# Patient Record
(demographics unavailable — no encounter records)

---

## 2024-11-04 NOTE — PROCEDURE
[Straight Catheterization] : insertion of a straight catheter [Stress Incontinence] : stress incontinence [Patient] : the patient [None] : none [Clear] : clear [Culture] : culture [No Complications] : no complications [Tolerated Well] : the patient tolerated the procedure well

## 2024-11-04 NOTE — DISCUSSION/SUMMARY
[FreeTextEntry1] : Patient is a 71 y/o who presents today for surgical counseling. We reviewed treatment options for her prolapse and she continues to desire surgical management. We also reviewed management options for occult stress urinary incontinence including: observation, pelvic floor exercises, continence devices, periurethral bulking agents, and surgical management. We discussed surgical management options and she continues to desire placement of midurethral sling with mesh. She understands this is not intended to treat frequency/urgency or UUI and that this may worsen postop.    We reviewed risks to the procedure including, but not limited to: bleeding, infection, pain, urinary retention requiring an indwelling catheter, persistence or recurrence of prolapse, worsening or recurrence of stress incontinence symptoms, failure of procedure to alleviate symptoms, development or worsening of overactive bladder or urge incontinence, voiding dysfunction, dyspareunia. We also extensively reviewed the risk of injury to the bladder, ureters, urethra, vagina, rectum, and bowel. We discussed the risk of sling mesh exposure. We discussed the risk of need for further surgery and surgical revision. Risks were explained in layman's terms.    She was counseled on the elective nature of the surgery. She expressed understanding of these risks and continues to desire the planned surgical procedure. We reviewed preoperative and postoperative instructions.   Consent was signed in the office for examination under anesthesia, anterior repair, mini midurethral sling, cystoscopy, possible posterior repair. She is aware of learners participating in her care. All questions were answered.

## 2024-11-04 NOTE — PHYSICAL EXAM
[Chaperone Present] : A chaperone was present in the examining room during all aspects of the physical examination [18837] : A chaperone was present during the pelvic exam. [No Acute Distress] : in no acute distress [Well developed] : well developed [Well Nourished] : ~L well nourished [Oriented x3] : oriented to person, place, and time [Normal Memory] : ~T memory was ~L unimpaired [Respirations regular] : ~T respiratory rate was regular [Labia Majora] : were normal [Labia Minora] : were normal [Normal Appearance] : general appearance was normal [Aa ____] : Aa [unfilled] [Ba ____] : Ba [unfilled] [C ____] : C [unfilled] [GH ____] : GH [unfilled] [PB ____] : PB [unfilled] [TVL ____] : TVL  [unfilled] [Ap ____] : Ap [unfilled] [Bp ____] : Bp [unfilled] [D ____] : D [unfilled] [Normal] : no abnormalities [FreeTextEntry2] : KATE Capellan [Tenderness] : ~T no ~M abdominal tenderness observed [Distended] : not distended

## 2024-11-04 NOTE — HISTORY OF PRESENT ILLNESS
[FreeTextEntry1] : Dionna is a 71yo P7 here for pre-surgical counseling.  Patient denies any new complaints.  She continues to desire surgical management.  Her pre-operative workup is as follows:  UDS (2024): correction 325 mL, + mL, + DO, PVR 0 cc.  [x] UCx collected today [ ] Medical clearance   PMH: none PSH: none OBGYN: denies abnormal paps or PMB, 7  Allergies: PCN Meds: none Fam: denies coagulopathies or gyn cancers Soc: denies substance use

## 2024-12-02 NOTE — DISCUSSION/SUMMARY
[FreeTextEntry1] : Dionna is s/p Anterior repair, mini MUS, cysto 11/21/24. She is doing well.  Postoperative restrictions, instructions, and bleeding precautions reviewed. She was counseled to call if any questions and RTO in 4 weeks or sooner, should issues arise. Patient verbalized understanding.  All questions answered.

## 2024-12-02 NOTE — OBJECTIVE
[Clean, Dry, Intact] : Clean, Dry, Intact [Good Support] : Good support [Healing well] : healing well [No Masses or Tenderness] : no masses or tenderness [FreeTextEntry3] : no mesh visualized or palpated on exam

## 2024-12-02 NOTE — SUBJECTIVE
[FreeTextEntry1] : feels well. Denies fevers, chills, n/v. Denies dysuria or hematuria. Reports pain well controlled. [FreeTextEntry8] : none [FreeTextEntry7] : none [FreeTextEntry6] : good [FreeTextEntry5] : no sx of BREEZY [FreeTextEntry4] : regular

## 2025-01-22 NOTE — SUBJECTIVE
[FreeTextEntry1] : Overall, feeling well. [FreeTextEntry8] : None. [FreeTextEntry7] : None. [FreeTextEntry6] : Tolerating diet without nausea/vomiting. [FreeTextEntry5] : Denies urinary frequency, urgency or dysuria. No BREEZY. Feels like she is fully emptying her bladder. [FreeTextEntry4] : Regular. [FreeTextEntry3] : Tolerating without dizziness/lightheadedness. [FreeTextEntry9] : Denies vaginal bleeding. No abnormal vaginal discharge.

## 2025-01-22 NOTE — OBJECTIVE
[Soft and Nontender] : soft and nontender [Clean, Dry, Intact] : Clean, Dry, Intact [Good Support] : Good support [Healing well] : healing well [No Masses or Tenderness] : no masses or tenderness [FreeTextEntry3] : No evidence of mesh exposure.

## 2025-01-22 NOTE — DISCUSSION/SUMMARY
[FreeTextEntry1] : NELIDA is a 60 year who is s/p anterior repair, mini MUS, cysto 11/21/24, doing well.  - Postoperative restrictions, instructions, and bleeding precautions reviewed. - Pt may resume all of her regular activities. - Discussed that patient is meeting post operative milestones.  - Avoid constipation and bearing down. Drink plenty of fluid and use OTC stool softener such as Colace if needed. - RTO as needed. She was counseled to call if any questions.   Patient verbalized understanding. All questions answered.